# Patient Record
Sex: FEMALE | Race: WHITE
[De-identification: names, ages, dates, MRNs, and addresses within clinical notes are randomized per-mention and may not be internally consistent; named-entity substitution may affect disease eponyms.]

---

## 2020-10-16 ENCOUNTER — HOSPITAL ENCOUNTER (EMERGENCY)
Dept: HOSPITAL 75 - ER | Age: 35
Discharge: HOME | End: 2020-10-16
Payer: COMMERCIAL

## 2020-10-16 VITALS — HEIGHT: 68.9 IN | BODY MASS INDEX: 21.45 KG/M2 | WEIGHT: 144.84 LBS

## 2020-10-16 VITALS — DIASTOLIC BLOOD PRESSURE: 98 MMHG | SYSTOLIC BLOOD PRESSURE: 154 MMHG

## 2020-10-16 DIAGNOSIS — Z88.2: ICD-10-CM

## 2020-10-16 DIAGNOSIS — Z88.1: ICD-10-CM

## 2020-10-16 DIAGNOSIS — R10.30: Primary | ICD-10-CM

## 2020-10-16 DIAGNOSIS — Z88.0: ICD-10-CM

## 2020-10-16 LAB
ALBUMIN SERPL-MCNC: 4.6 GM/DL (ref 3.2–4.5)
ALP SERPL-CCNC: 36 U/L (ref 40–136)
ALT SERPL-CCNC: 20 U/L (ref 0–55)
AMYLASE SERPL-CCNC: 82 U/L (ref 25–125)
APTT PPP: YELLOW S
BACTERIA #/AREA URNS HPF: NEGATIVE /HPF
BASOPHILS # BLD AUTO: 0 10^3/UL (ref 0–0.1)
BASOPHILS NFR BLD AUTO: 0 % (ref 0–10)
BILIRUB SERPL-MCNC: 0.6 MG/DL (ref 0.1–1)
BILIRUB UR QL STRIP: NEGATIVE
BUN/CREAT SERPL: 17
CALCIUM SERPL-MCNC: 9.7 MG/DL (ref 8.5–10.1)
CHLORIDE SERPL-SCNC: 103 MMOL/L (ref 98–107)
CO2 SERPL-SCNC: 22 MMOL/L (ref 21–32)
CREAT SERPL-MCNC: 0.83 MG/DL (ref 0.6–1.3)
EOSINOPHIL # BLD AUTO: 0.1 10^3/UL (ref 0–0.3)
EOSINOPHIL NFR BLD AUTO: 1 % (ref 0–10)
FIBRINOGEN PPP-MCNC: CLEAR MG/DL
GFR SERPLBLD BASED ON 1.73 SQ M-ARVRAT: > 60 ML/MIN
GLUCOSE SERPL-MCNC: 98 MG/DL (ref 70–105)
GLUCOSE UR STRIP-MCNC: NEGATIVE MG/DL
HCT VFR BLD CALC: 41 % (ref 35–52)
HGB BLD-MCNC: 14 G/DL (ref 11.5–16)
KETONES UR QL STRIP: (no result)
LEUKOCYTE ESTERASE UR QL STRIP: NEGATIVE
LIPASE SERPL-CCNC: 30 U/L (ref 8–78)
LYMPHOCYTES # BLD AUTO: 2.8 10^3/UL (ref 1–4)
LYMPHOCYTES NFR BLD AUTO: 22 % (ref 12–44)
MANUAL DIFFERENTIAL PERFORMED BLD QL: NO
MCH RBC QN AUTO: 31 PG (ref 25–34)
MCHC RBC AUTO-ENTMCNC: 35 G/DL (ref 32–36)
MCV RBC AUTO: 89 FL (ref 80–99)
MONOCYTES # BLD AUTO: 0.6 10^3/UL (ref 0–1)
MONOCYTES NFR BLD AUTO: 5 % (ref 0–12)
NEUTROPHILS # BLD AUTO: 9.1 10^3/UL (ref 1.8–7.8)
NEUTROPHILS NFR BLD AUTO: 72 % (ref 42–75)
NITRITE UR QL STRIP: NEGATIVE
PH UR STRIP: 6 [PH] (ref 5–9)
PLATELET # BLD: 246 10^3/UL (ref 130–400)
PMV BLD AUTO: 9.7 FL (ref 9–12.2)
POTASSIUM SERPL-SCNC: 3.7 MMOL/L (ref 3.6–5)
PROT SERPL-MCNC: 6.9 GM/DL (ref 6.4–8.2)
PROT UR QL STRIP: NEGATIVE
RBC #/AREA URNS HPF: (no result) /HPF
SODIUM SERPL-SCNC: 138 MMOL/L (ref 135–145)
SP GR UR STRIP: 1.01 (ref 1.02–1.02)
WBC # BLD AUTO: 12.7 10^3/UL (ref 4.3–11)
WBC #/AREA URNS HPF: (no result) /HPF

## 2020-10-16 PROCEDURE — 74177 CT ABD & PELVIS W/CONTRAST: CPT

## 2020-10-16 PROCEDURE — 85025 COMPLETE CBC W/AUTO DIFF WBC: CPT

## 2020-10-16 PROCEDURE — 74019 RADEX ABDOMEN 2 VIEWS: CPT

## 2020-10-16 PROCEDURE — 36415 COLL VENOUS BLD VENIPUNCTURE: CPT

## 2020-10-16 PROCEDURE — 80053 COMPREHEN METABOLIC PANEL: CPT

## 2020-10-16 PROCEDURE — 81000 URINALYSIS NONAUTO W/SCOPE: CPT

## 2020-10-16 PROCEDURE — 82150 ASSAY OF AMYLASE: CPT

## 2020-10-16 PROCEDURE — 84703 CHORIONIC GONADOTROPIN ASSAY: CPT

## 2020-10-16 PROCEDURE — 83690 ASSAY OF LIPASE: CPT

## 2020-10-16 NOTE — ED ABDOMINAL PAIN
General


Chief Complaint:  Abdominal/GI Problems


Stated Complaint:  ABDOMINAL PAIN;VOMITING


Source of Information:  Patient





History of Present Illness


Date Seen by Provider:  Oct 16, 2020


Time Seen by Provider:  20:43


Initial Comments


PT ARRIVES VIA POV FROM HOME


C/O SUDDEN ONSET OF DIFFUSE LOWER ABDOMINAL PAIN, AND BILATERAL FLANK/LOWER BACK

PAIN-BEGAN AT 1600 TODAY


C/O NAUSEA, AND VOMITED X 1


HAS HAD A FEW SMALL, SOFT BM'S TODAY


NO URINARY SYMPTOMS


NO FEVER/SWEATS/CHILLS


TOOK TUMS AND PEPTO BISMOL WITH OUT RELIEF





NO HISTORY OF SIMILAR


NO PRIOR ABDOMINAL SURGERIES OR GI OR  PROBLEMS





LMP 1 1/2 WEEKS AGO. NO BIRTH CONTROL.





PCP: NONE





Allergies and Home Medications


Allergies


Coded Allergies:  


     Penicillins (Verified  Allergy, Unknown, 10/16/20)


     Sulfa (Sulfonamide Antibiotics) (Verified  Allergy, Unknown, 10/16/20)


     erythromycin base (Verified  Allergy, Unknown, 10/16/20)





Home Medications


Hyoscyamine Sulfate 0.125 Mg Tab.subl, 0.25 MG SL Q4H


   Prescribed by: SILVESTRE MCKINNEY on 10/16/20 2258


Naproxen 500 Mg Tablet.dr, 500 MG PO BID


   Prescribed by: SILVESTRE MCKINNEY on 10/16/20 2258


Ondansetron 4 Mg Tab.rapdis, 4 MG PO Q4H


   Prescribed by: SILVESTRE MCKINNEY on 10/16/20 2258





Patient Home Medication List


Home Medication List Reviewed:  Yes





Review of Systems


Review of Systems


Constitutional:  no symptoms reported


Respiratory:  No Symptoms Reported


Cardiovascular:  No Symptoms Reported


Gastrointestinal:  See HPI, Abdominal Pain; Denies Constipated, Denies Diarrhea;

Nausea, Vomiting


Genitourinary:  No Symptoms Reported


Musculoskeletal:  see HPI, back pain


Skin:  no symptoms reported


Psychiatric/Neurological:  No Symptoms Reported


Endocrine:  No Symptoms Reported


Hematologic/Lymphatic:  No Symptoms Reported





Past Medical-Social-Family Hx


Patient Social History


Alcohol Use:  Regular Use (DRINKS COUPLE OF TIMES A WEEK)


Recreational Drug Use:  No


Smoking Status:  Never a Smoker


Recent Foreign Travel:  No


Contact w/Someone Who Travel:  No





Past Medical History


Surgeries:  Yes (BREAST LIFT; D&C)


Adenoidectomy, Tonsillectomy


Respiratory:  No


Cardiac:  No


Neurological:  No


Reproductive Disorders:  No


Genitourinary:  No


Gastrointestinal:  No


Musculoskeletal:  No


Endocrine:  No


HEENT:  No


Cancer:  No


Psychosocial:  No


Integumentary:  No


Blood Disorders:  No





Physical Exam


Vital Signs





Vital Signs - First Documented








 10/16/20





 20:43


 


Temp 37.2


 


Pulse 72


 


Resp 16


 


B/P (MAP) 137/81 (99)


 


Pulse Ox 100





Capillary Refill :


Height/Weight/BMI


Height: '"


Weight: lbs. oz. kg;  BMI


Method:


General Appearance:  WD/WN, no apparent distress, other (WALKS UPRIGHT AND MOVES

WITHOUT DIFFICULTY)


Respiratory:  normal breath sounds, no respiratory distress, no accessory muscle

use


Cardiovascular:  regular rate, rhythm, no murmur


Gastrointestinal:  normal bowel sounds, soft, no organomegaly, no pulsatile 

mass; No distended, No guarding, No rebound; tenderness (VERY SLIGHT TENDERNESS 

ALL ACROSS LOWER ABDOMEN AND BILATERAL FLANKS); No hernia, No mass


Extremities:  normal inspection


Back:  CVA tenderness (R), CVA tenderness (L)


Neurologic/Psychiatric:  CNs II-XII nml as tested, no motor/sensory deficits, 

alert, normal mood/affect, oriented x 3


Skin:  normal color, warm/dry, tattoos/piercings (MULTIPLE TATTOOS)





Progress/Results/Core Measures


Results/Orders


Lab Results





Laboratory Tests








Test


 10/16/20


20:50 10/16/20


20:53 Range/Units


 


 


Urine Color YELLOW    


 


Urine Clarity CLEAR    


 


Urine pH 6.0   5-9  


 


Urine Specific Gravity 1.010 L  1.016-1.022  


 


Urine Protein NEGATIVE   NEGATIVE  


 


Urine Glucose (UA) NEGATIVE   NEGATIVE  


 


Urine Ketones TRACE H  NEGATIVE  


 


Urine Nitrite NEGATIVE   NEGATIVE  


 


Urine Bilirubin NEGATIVE   NEGATIVE  


 


Urine Urobilinogen 0.2   < = 1.0  MG/DL


 


Urine Leukocyte Esterase NEGATIVE   NEGATIVE  


 


Urine RBC (Auto) NEGATIVE   NEGATIVE  


 


Urine RBC NONE    /HPF


 


Urine WBC NONE    /HPF


 


Urine Squamous Epithelial


Cells 10-25 H


 


  /HPF





 


Urine Crystals NONE    /LPF


 


Urine Bacteria NEGATIVE    /HPF


 


Urine Casts NONE    /LPF


 


Urine Mucus NEGATIVE    /LPF


 


Urine Culture Indicated NO    


 


White Blood Count


 


 12.7 H


 4.3-11.0


10^3/uL


 


Red Blood Count


 


 4.55 


 3.80-5.11


10^6/uL


 


Hemoglobin  14.0  11.5-16.0  g/dL


 


Hematocrit  41  35-52  %


 


Mean Corpuscular Volume  89  80-99  fL


 


Mean Corpuscular Hemoglobin  31  25-34  pg


 


Mean Corpuscular Hemoglobin


Concent 


 35 


 32-36  g/dL





 


Red Cell Distribution Width  13.0  10.0-14.5  %


 


Platelet Count


 


 246 


 130-400


10^3/uL


 


Mean Platelet Volume  9.7  9.0-12.2  fL


 


Immature Granulocyte % (Auto)  0   %


 


Neutrophils (%) (Auto)  72  42-75  %


 


Lymphocytes (%) (Auto)  22  12-44  %


 


Monocytes (%) (Auto)  5  0-12  %


 


Eosinophils (%) (Auto)  1  0-10  %


 


Basophils (%) (Auto)  0  0-10  %


 


Neutrophils # (Auto)


 


 9.1 H


 1.8-7.8


10^3/uL


 


Lymphocytes # (Auto)


 


 2.8 


 1.0-4.0


10^3/uL


 


Monocytes # (Auto)


 


 0.6 


 0.0-1.0


10^3/uL


 


Eosinophils # (Auto)


 


 0.1 


 0.0-0.3


10^3/uL


 


Basophils # (Auto)


 


 0.0 


 0.0-0.1


10^3/uL


 


Immature Granulocyte # (Auto)


 


 0.0 


 0.0-0.1


10^3/uL


 


Sodium Level  138  135-145  MMOL/L


 


Potassium Level  3.7  3.6-5.0  MMOL/L


 


Chloride Level  103    MMOL/L


 


Carbon Dioxide Level  22  21-32  MMOL/L


 


Anion Gap  13  5-14  MMOL/L


 


Blood Urea Nitrogen  14  7-18  MG/DL


 


Creatinine


 


 0.83 


 0.60-1.30


MG/DL


 


Estimat Glomerular Filtration


Rate 


 > 60 


  





 


BUN/Creatinine Ratio  17   


 


Glucose Level  98    MG/DL


 


Calcium Level  9.7  8.5-10.1  MG/DL


 


Corrected Calcium    8.5-10.1  MG/DL


 


Total Bilirubin  0.6  0.1-1.0  MG/DL


 


Aspartate Amino Transf


(AST/SGOT) 


 19 


 5-34  U/L





 


Alanine Aminotransferase


(ALT/SGPT) 


 20 


 0-55  U/L





 


Alkaline Phosphatase  36 L   U/L


 


Total Protein  6.9  6.4-8.2  GM/DL


 


Albumin  4.6 H 3.2-4.5  GM/DL


 


Amylase Level  82    U/L


 


Lipase  30  8-78  U/L








My Orders





Orders - SILVESTRE MCKINNEY DO


Ed Iv/Invasive Line Start (10/16/20 20:48)


Amylase (10/16/20 20:48)


Cbc With Automated Diff (10/16/20 20:48)


Comprehensive Metabolic Panel (10/16/20 20:48)


Lipase (10/16/20 20:48)


Ua Culture If Indicated (10/16/20 20:48)


Urine Pregnancy Bedside (10/16/20 20:48)


Ed Iv/Invasive Line Start (10/16/20 20:48)


Lactated Ringers (Lr 1000 Ml Iv Solution (10/16/20 20:48)


Ketorolac Injection (Toradol Injection) (10/16/20 21:15)


Ondansetron Injection (Zofran Injectio (10/16/20 21:15)


Abdomen, Flat & Upright/Decub (10/16/20 21:40)


Ct Abd/Pelv W (Appendicitis) (10/16/20 21:40)


Rx-Hyoscyamine Tab (Rx-Levsin Sl) (10/16/20 22:54)


Rx-Ondansetron Po (Rx-Zofran Po) (10/16/20 22:54)


Rx-Naproxen (Rx-Naprosyn) (10/16/20 22:54)





Medications Given in ED





Current Medications








 Medications  Dose


 Ordered  Sig/Lizeth


 Route  Start Time


 Stop Time Status Last Admin


Dose Admin


 


 Ketorolac


 Tromethamine  30 mg  ONCE  ONCE


 IVP  10/16/20 21:15


 10/16/20 21:16 DC 10/16/20 21:17


30 MG


 


 Lactated Ringer's  1,000 ml @ 


 0 mls/hr  Q0M ONCE


 IV  10/16/20 20:48


 10/16/20 20:51 DC 10/16/20 20:56


0 MLS/HR


 


 Ondansetron HCl  4 mg  ONCE  ONCE


 IVP  10/16/20 21:15


 10/16/20 21:16 DC 10/16/20 21:17


4 MG








Vital Signs/I&O











 10/16/20





 20:43


 


Temp 37.2


 


Pulse 72


 


Resp 16


 


B/P (MAP) 137/81 (99)


 


Pulse Ox 100











Progress


Progress Note :  


Progress Note


GIVEN IV FLUIDS, ZOFRAN AND TORADOL WITH RELIEF OF SYMPTOMS





Diagnostic Imaging





Comments


ABDOMEN XRAYS--NO ACUTE PROCESS, PENDING RADIOLOGIST REVIEW





CT ABDOMEN/PELVIS--NON SPECIFIC PROMINENCE OF WALLS OF SMALL 

BOWEL--UNDERDISTENTION VS ENTERITIS. COLLAPSING RIGHT OVARIAN CYST. MILDLY 

PROMINENT MESENTERIC LYMPH NODES--PER [facility] VIA FAX AT 1001


   Reviewed:  Reviewed by Me





Departure


Impression





   Primary Impression:  


   Lower abdominal pain of unknown etiology


Disposition:  01 HOME, SELF-CARE


Condition:  Improved





Departure-Patient Inst.


Referrals:  


NO,LOCAL PHYSICIAN (PCP)


Primary Care Physician


Patient Instructions:  Severe Abdominal Pain, Adult (DC)





Add. Discharge Instructions:  


CLEAR LIQUIDS--WATER, BROTH, JELLO, GATORADE


WHEN YOU ARE BETTER, ADD BRATS DIET TO CLEAR LIQUIDS--BANANAS, RICE, APPLESAUCE,

TOAST, SALTINES





FOLLOW UP WITH  OF CHOICE IN 2J-3 DAYS IF NO BETTER, RETURN TO ER IF WORSE





All discharge instructions reviewed with patient and/or family. Voiced 

understanding.


Scripts


Ondansetron (Ondansetron Odt) 4 Mg Tab.rapdis


4 MG PO Q4H for Nausea/Vomiting, #10 TAB


   Prov: SILVESTRE MCKINNEY DO         10/16/20 


Naproxen (Naproxen) 500 Mg Tablet.dr


500 MG PO BID, #20 TAB


   Prov: SILVESTRE MCKINNEY DO         10/16/20 


Hyoscyamine Sulfate (Levsin-Sl) 0.125 Mg Tab.subl


0.25 MG SL Q4H, #10 TAB


   Prov: SILVESTRE MCKINNEY DO         10/16/20











SILVESTRE MCKINNEY DO                 Oct 16, 2020 20:53

## 2020-10-17 NOTE — DIAGNOSTIC IMAGING REPORT
Indication: Right lower quadrant pain.



Comparison: None.



Discussion: Two views of the abdomen were obtained. No

constipation. No abnormal small bowel loops. No pneumatosis or

pneumoperitoneum. Minimal leftward curvature of the lumbar spine.

No acute osseous abnormality. No pathologic calcifications.



Impression:

1. No acute abnormality identified within the abdomen.



Dictated by: 



  Dictated on workstation # [id number]

## 2020-10-17 NOTE — DIAGNOSTIC IMAGING REPORT
PROCEDURE: CT abdomen and pelvis with contrast, rule out

appendicitis.



TECHNIQUE: Multiple contiguous axial images were obtained through

the abdomen and pelvis after the administration of intravenous

contrast. All CT scans use one or more of the following dose

optimizing techniques: automated exposure control, MA and/or KvP

adjustment based on patient size and exam type or iterative

reconstruction. 



Indication: Right lower quadrant pain.



Comparison: Plain film same date.



Discussion: The lung bases are well-aerated. Normal heart size.

No pleural or pericardial fluid. The liver, gallbladder,

pancreas, stomach, spleen, and adrenal glands are unremarkable.

No renal stone or hydronephrosis. The aorta is normal in caliber.

The appendix is normal. Suspect collapsing follicle within the

right ovary which could account for pain. The uterus and urinary

bladder are unremarkable. No obstruction, pneumatosis, or

pneumoperitoneum. No ascites or pathologically enlarged lymph

nodes identified. No acute osseous abnormality. Degenerative disc

disease noted at the L5-S1 level.



Impression:

1. The appendix is normal. Collapsing follicle is noted within

the right ovary which could account for pain. No acute

abnormality otherwise.

2. Agree with preliminary report. 



Dictated by: 



  Dictated on workstation # [id number]